# Patient Record
Sex: MALE | Race: WHITE | NOT HISPANIC OR LATINO | Employment: PART TIME | URBAN - METROPOLITAN AREA
[De-identification: names, ages, dates, MRNs, and addresses within clinical notes are randomized per-mention and may not be internally consistent; named-entity substitution may affect disease eponyms.]

---

## 2021-03-05 ENCOUNTER — IMMUNIZATION (OUTPATIENT)
Dept: VACCINE CLINIC | Facility: HOSPITAL | Age: 21
End: 2021-03-05

## 2021-03-05 PROCEDURE — 0001A: CPT | Performed by: INTERNAL MEDICINE

## 2021-03-05 PROCEDURE — 91300 HC SARSCOV02 VAC 30MCG/0.3ML IM: CPT | Performed by: INTERNAL MEDICINE

## 2021-03-26 ENCOUNTER — IMMUNIZATION (OUTPATIENT)
Dept: VACCINE CLINIC | Facility: HOSPITAL | Age: 21
End: 2021-03-26

## 2021-03-26 PROCEDURE — 91300 HC SARSCOV02 VAC 30MCG/0.3ML IM: CPT | Performed by: INTERNAL MEDICINE

## 2021-03-26 PROCEDURE — 0002A: CPT | Performed by: INTERNAL MEDICINE

## 2023-07-21 PROBLEM — N13.30 HYDRONEPHROSIS: Status: ACTIVE | Noted: 2023-07-21

## 2023-08-09 ENCOUNTER — HOSPITAL ENCOUNTER (OUTPATIENT)
Dept: ULTRASOUND IMAGING | Facility: HOSPITAL | Age: 23
Discharge: HOME | End: 2023-08-09
Admitting: UROLOGY
Payer: COMMERCIAL

## 2023-08-09 DIAGNOSIS — N13.30 HYDRONEPHROSIS, UNSPECIFIED HYDRONEPHROSIS TYPE: ICD-10-CM

## 2023-08-09 PROCEDURE — 76775 US EXAM ABDO BACK WALL LIM: CPT

## 2023-08-16 ENCOUNTER — OFFICE VISIT (OUTPATIENT)
Dept: UROLOGY | Facility: CLINIC | Age: 23
End: 2023-08-16
Payer: COMMERCIAL

## 2023-08-16 VITALS — HEART RATE: 90 BPM | BODY MASS INDEX: 20.31 KG/M2 | OXYGEN SATURATION: 98 % | WEIGHT: 134 LBS | HEIGHT: 68 IN

## 2023-08-16 DIAGNOSIS — N20.1 LEFT URETERAL CALCULUS: Primary | ICD-10-CM

## 2023-08-16 NOTE — PROGRESS NOTES
Office Note Kidney Stone      Patient Name: Elie Arroyo  : 2000   MRN: [id number]     Chief Complaint: History of Nephrolithiasis/Ureterolithiasis     Referring Provider: No ref. provider found    History of Present Illness: Elie Arroyo is a 23 y.o. male who presents today for history of 2-3 mm left UPJ calculus with mild hydronephrosis. He was last seen in office on 23 and had passed the stone at that time. He presents today for follow up renal US.    Renal US 23; No hydronephrosis.    He reports this is his second stone he has passed. He previously passed a small stone at age 10.  His mother has stones.    He denies flank pain, dysuria or hematuria. He is feeling well. He has been trying to increase water intake and has been using fresh squeezed lemon in water.   Subjective      Review of System: Review of Systems   Genitourinary:  Negative for decreased urine volume, difficulty urinating, dysuria, enuresis, flank pain, frequency, hematuria and urgency.      I have reviewed the ROS documented by my clinical staff, updated as appropriate and I agree. SUN Calderon    Past Medical History:   Past Medical History:   Diagnosis Date    Hydronephrosis 2023    Kidney stone , 2023    One when I was 10, one when I was 23    Urinary tract infection 2023    From kidney stone       Past Surgical History: History reviewed. No pertinent surgical history.    Family History:   Family History   Problem Relation Age of Onset    Heart disease Mother         Heart attack    Heart disease Father         Pre diabetic    Cancer Maternal Grandfather         Colon    Clotting disorder Maternal Grandfather         Clots in lungs, legs, brain    Heart disease Maternal Grandfather         Heart attack, diabetes    Heart disease Maternal Grandmother         Heart attack    Heart disease Paternal Grandfather         Pre diabetic, high triglycerides       Social History:   Social  "History     Socioeconomic History    Marital status: Single   Tobacco Use    Smoking status: Never     Passive exposure: Past    Smokeless tobacco: Never    Tobacco comments:     Only ever ingested second hand in any significant quantity in the past.   Substance and Sexual Activity    Alcohol use: Never    Drug use: Never    Sexual activity: Not Currently     Partners: Female, Male     Birth control/protection: Condom, Spermicide, Same-sex partner       Medications:     Current Outpatient Medications:     oxyCODONE-acetaminophen (PERCOCET) 5-325 MG per tablet, Take 1 tablet by mouth Every 6 (Six) Hours As Needed for Moderate Pain or Severe Pain for up to 12 doses. (Patient not taking: Reported on 8/16/2023), Disp: 12 tablet, Rfl: 0    Allergies:   No Known Allergies    Objective     Physical Exam:   Vital Signs:   Vitals:    08/16/23 1007   Pulse: 90   SpO2: 98%   Weight: 60.8 kg (134 lb)   Height: 172.7 cm (67.99\")   PainSc: 0-No pain     Body mass index is 20.38 kg/mý.     Physical Exam  Vitals and nursing note reviewed.   Constitutional:       Appearance: Normal appearance.   HENT:      Head: Normocephalic and atraumatic.      Nose: Nose normal.      Mouth/Throat:      Mouth: Mucous membranes are moist.   Eyes:      Pupils: Pupils are equal, round, and reactive to light.   Pulmonary:      Effort: Pulmonary effort is normal.   Abdominal:      General: Abdomen is flat.      Palpations: Abdomen is soft.   Musculoskeletal:         General: Normal range of motion.      Cervical back: Normal range of motion.   Skin:     General: Skin is warm and dry.      Capillary Refill: Capillary refill takes less than 2 seconds.   Neurological:      General: No focal deficit present.      Mental Status: He is alert.   Psychiatric:         Mood and Affect: Mood normal.     Labs:   Brief Urine Lab Results  (Last result in the past 365 days)        Color   Clarity   Blood   Leuk Est   Nitrite   Protein   CREAT   Urine HCG        " 07/13/23 1224 Orange   Turbid   Large (3+)   Small (1+)   Negative   100 mg/dL (2+)                        Lab Results   Component Value Date    GLUCOSE 140 (H) 07/13/2023    CALCIUM 9.5 07/13/2023     07/13/2023    K 3.4 (L) 07/13/2023    CO2 27.0 07/13/2023     07/13/2023    BUN 12 07/13/2023    CREATININE 1.08 07/13/2023    BCR 11.1 07/13/2023    ANIONGAP 13.0 07/13/2023       Lab Results   Component Value Date    WBC 6.49 07/13/2023    HGB 15.3 07/13/2023    HCT 45.5 07/13/2023    MCV 89.2 07/13/2023     07/13/2023         Images:   CT Abdomen Pelvis Without Contrast    Result Date: 7/13/2023  1. There is a 2 to 3 mm calculus just below the left UPJ with associated mild obstructive uropathy. 2. No acute intra-abdominal or intrapelvic abnormality otherwise appreciated Electronically Signed: Partha Munguia  7/13/2023 1:27 PM EDT  Workstation ID: [id number]    US Renal Bilateral    Result Date: 8/9/2023  Impression: Unremarkable appearance of the kidneys. Electronically Signed: Mike Ferrera MD  8/9/2023 11:36 AM CDT  Workstation ID: [id number]      Measures:   Tobacco:   Elie Arroyo  reports that he has never smoked. He has been exposed to tobacco smoke. He has never used smokeless tobacco..      Urine Incontinence: Patient reports that he is not currently experiencing any symptoms of urinary incontinence.     Assessment / Plan      Assessment/Plan:   Elie Arroyo is a 23 y.o. male who presented today with history of 2-3 mm Left UPJ calculus and mild left hydronephrosis. He has passed his stone and hydronephrosis has resolved on Renal US.     We discussed to continue increasing water intake to 2-2.5 L per day as well as using fresh squeezed lemon in water and decreasing sodium intake in diet. He is agreeable. He will follow up in 1 year.    Diagnoses and all orders for this visit:    1. Left ureteral calculus (Primary)       Follow Up:   Return in about 1 year (around 8/16/2024) for  Dr. Farley .    I spent approximately 20 minutes providing clinical care for this patient; including review of patient's chart and provider documentation, face to face time spent with patient in examination room (obtaining history, performing physical exam, discussing diagnosis and management options), placing orders, and completing patient documentation.     SUN Calderon  [facility] [place]

## 2024-11-18 ENCOUNTER — APPOINTMENT (OUTPATIENT)
Dept: PRIMARY CARE | Facility: CLINIC | Age: 24
End: 2024-11-18
Payer: COMMERCIAL

## 2024-11-18 VITALS
SYSTOLIC BLOOD PRESSURE: 126 MMHG | WEIGHT: 139 LBS | DIASTOLIC BLOOD PRESSURE: 84 MMHG | HEIGHT: 67 IN | BODY MASS INDEX: 21.82 KG/M2 | HEART RATE: 87 BPM

## 2024-11-18 DIAGNOSIS — Z00.00 ROUTINE GENERAL MEDICAL EXAMINATION AT A HEALTH CARE FACILITY: Primary | ICD-10-CM

## 2024-11-18 DIAGNOSIS — F31.12 BIPOLAR 1 DISORDER WITH MODERATE MANIA (MULTI): ICD-10-CM

## 2024-11-18 PROBLEM — L40.9 PSORIASIS: Status: ACTIVE | Noted: 2017-09-01

## 2024-11-18 PROCEDURE — 99385 PREV VISIT NEW AGE 18-39: CPT | Performed by: FAMILY MEDICINE

## 2024-11-18 PROCEDURE — 3008F BODY MASS INDEX DOCD: CPT | Performed by: FAMILY MEDICINE

## 2024-11-18 RX ORDER — BUPROPION HYDROCHLORIDE 75 MG/1
1 TABLET ORAL
COMMUNITY
Start: 2024-11-10 | End: 2024-11-18 | Stop reason: ALTCHOICE

## 2024-11-18 RX ORDER — BUPROPION HYDROCHLORIDE 150 MG/1
150 TABLET ORAL EVERY MORNING
Qty: 90 TABLET | Refills: 1 | Status: SHIPPED | OUTPATIENT
Start: 2024-11-18 | End: 2025-11-18

## 2024-11-18 RX ORDER — LAMOTRIGINE 25 MG/1
1 TABLET ORAL
COMMUNITY
Start: 2024-11-10 | End: 2024-11-18 | Stop reason: SDUPTHER

## 2024-11-18 RX ORDER — LAMOTRIGINE 25 MG/1
25 TABLET ORAL 2 TIMES DAILY
Qty: 180 TABLET | Refills: 1 | Status: SHIPPED | OUTPATIENT
Start: 2024-11-18 | End: 2025-05-17

## 2024-11-18 ASSESSMENT — PATIENT HEALTH QUESTIONNAIRE - PHQ9
SUM OF ALL RESPONSES TO PHQ9 QUESTIONS 1 AND 2: 0
1. LITTLE INTEREST OR PLEASURE IN DOING THINGS: NOT AT ALL
2. FEELING DOWN, DEPRESSED OR HOPELESS: NOT AT ALL

## 2024-11-18 ASSESSMENT — ENCOUNTER SYMPTOMS
LOSS OF SENSATION IN FEET: 0
OCCASIONAL FEELINGS OF UNSTEADINESS: 0
DEPRESSION: 0

## 2024-11-18 NOTE — PROGRESS NOTES
"Subjective   Patient ID: Vin Wagner is a 24 y.o. adult who presents for New Patient Visit and Annual Exam.    Last Physical : ___1 _ Years ago     Pt's PMH, PSH, SH, FH , meds and allergies was obtained / reviewed and updated .     Dental  Visits : Y  Vision issues : N  Hearing issues : N    Immunizations : Y    Diet :  Could be better   Exercise:  Not regularly  Weight concerns : None    Alcohol: as noted in SH  Tobacco: as noted in SH  Recreational drugs :  None /as noted in SH     Sexually active : Active   Contraception :   Erectile dysfunction :    Colorectal cancer screening   Prostate cancer screening     Metabolic screening   - Lipids   - Glucose   Patient states that he was diagnosed with bipolar disorder he is on Lamictal and Wellbutrin was doing well had discussed increasing his dose of Wellbutrin prior to switching insurances denies any side effects previous care was in [place]  Currently working at [employer] mood has been stable  Has had kidney stones last one was in [place]  ==================================    Visit Vitals  /84   Pulse 87   Ht 1.702 m (5' 7\")   Wt 63 kg (139 lb)   BMI 21.77 kg/m²   Smoking Status Never   BSA 1.73 m²      =====================  Review of Systems:    Constitutional: no chills, no fever and no night sweats.     Eyes: no blurred vision and no eyesight problems.     ENT: no hearing loss, no nasal congestion, no nasal discharge, no hoarseness and no sore throat.     Cardiovascular: no chest pain, no intermittent leg claudication, no lower extremity edema, no palpitations and no syncope.     Respiratory: no cough, no shortness of breath during exertion, no shortness of breath at rest and no wheezing.     Gastrointestinal: no abdominal pain, no constipation, no blood in stools, no diarrhea, no melena, no nausea, no rectal pain and no vomiting.     Genitourinary: no dysuria, no change in urinary frequency, no urinary hesitancy and no feelings of " urinary urgency.     Musculoskeletal: no arthralgias, no back pain and no myalgias.     Integumentary: no new skin lesions and no rashes.     Neurological: no difficulty walking, no headache, no limb weakness, no numbness and no tingling.     Psychiatric: As per HPI    Endocrine: no recent weight gain and no recent weight loss.     Hematologic/Lymphatic: no tendency for easy bruising and no swollen glands.          All other systems have been reviewed and are negative for complaint.    =====================================================    Physical exam :    Constitutional: Alert and in no acute distress. Well developed, well nourished.     Eyes: Normal external exam. Pupils were equal in size, round, reactive to light (PERRL) with normal accommodation and extraocular movements intact (EOMI).     Ears, Nose, Mouth, and Throat: External inspection of ears and nose: Normal.  Otoscopic examination: Normal.      Neck: No neck mass was observed. Supple.     Cardiovascular: Heart rate and rhythm were normal, normal S1 and S2, no gallops, no murmurs and no pericardial rub    Pulmonary: No respiratory distress. Clear bilateral breath sounds.     Abdomen: Soft nontender; no abdominal mass palpated. No organomegaly.     Musculoskeletal: No joint swelling seen, normal movements of all extremities. Range of motion: Normal.  Muscle strength/tone: Normal.      Skin: Normal skin color and pigmentation, normal skin turgor, and no rash.     Neurologic: Deep tendon reflexes were 2+ and symmetric. Sensation: Normal.     Psychiatric: Judgment and insight: Intact. Mood and affect: Normal.    Lymphatic : Cervical/ axillary/ groin Lns Palpable/ non palpable       Assessment/Plan    Problem List Items Addressed This Visit       Bipolar 1 disorder with moderate shweta (Multi)    Relevant Medications    lamoTRIgine (LaMICtal) 25 mg tablet    buPROPion XL (Wellbutrin XL) 150 mg 24 hr tablet    Other Relevant Orders    Referral to [facility]    Routine general medical examination at a health care facility - Primary    Relevant Orders    Lipid Panel    CBC    TSH with reflex to Free T4 if abnormal    Hemoglobin A1C    Comprehensive Metabolic Panel

## 2024-12-01 PROBLEM — Z00.00 ROUTINE GENERAL MEDICAL EXAMINATION AT A HEALTH CARE FACILITY: Status: ACTIVE | Noted: 2024-12-01

## 2024-12-05 ENCOUNTER — LAB (OUTPATIENT)
Dept: LAB | Facility: LAB | Age: 24
End: 2024-12-05
Payer: COMMERCIAL

## 2024-12-05 DIAGNOSIS — Z00.00 ROUTINE GENERAL MEDICAL EXAMINATION AT A HEALTH CARE FACILITY: ICD-10-CM

## 2024-12-05 LAB
ALBUMIN SERPL BCP-MCNC: 4.4 G/DL (ref 3.4–5)
ALP SERPL-CCNC: 60 U/L (ref 33–120)
ALT SERPL W P-5'-P-CCNC: 14 U/L (ref 7–52)
ANION GAP SERPL CALC-SCNC: 11 MMOL/L (ref 10–20)
AST SERPL W P-5'-P-CCNC: 16 U/L (ref 9–39)
BILIRUB SERPL-MCNC: 1.4 MG/DL (ref 0–1.2)
BUN SERPL-MCNC: 10 MG/DL (ref 6–23)
CALCIUM SERPL-MCNC: 9.4 MG/DL (ref 8.6–10.3)
CHLORIDE SERPL-SCNC: 105 MMOL/L (ref 98–107)
CHOLEST SERPL-MCNC: 171 MG/DL (ref 0–199)
CHOLESTEROL/HDL RATIO: 3.7
CO2 SERPL-SCNC: 30 MMOL/L (ref 21–32)
CREAT SERPL-MCNC: 1.02 MG/DL (ref 0.5–1.3)
EGFRCR SERPLBLD CKD-EPI 2021: >90 ML/MIN/1.73M*2
ERYTHROCYTE [DISTWIDTH] IN BLOOD BY AUTOMATED COUNT: 11.8 % (ref 11.5–14.5)
GLUCOSE SERPL-MCNC: 78 MG/DL (ref 74–99)
HCT VFR BLD AUTO: 49.9 % (ref 36–52)
HDLC SERPL-MCNC: 45.9 MG/DL
HGB BLD-MCNC: 16.4 G/DL (ref 12–17.5)
LDLC SERPL CALC-MCNC: 116 MG/DL
MCH RBC QN AUTO: 30.1 PG (ref 26–34)
MCHC RBC AUTO-ENTMCNC: 32.9 G/DL (ref 32–36)
MCV RBC AUTO: 92 FL (ref 80–100)
NON HDL CHOLESTEROL: 125 MG/DL (ref 0–149)
NRBC BLD-RTO: 0 /100 WBCS (ref 0–0)
PLATELET # BLD AUTO: 331 X10*3/UL (ref 150–450)
POTASSIUM SERPL-SCNC: 4.1 MMOL/L (ref 3.5–5.3)
PROT SERPL-MCNC: 6.7 G/DL (ref 6.4–8.2)
RBC # BLD AUTO: 5.44 X10*6/UL (ref 4–5.9)
SODIUM SERPL-SCNC: 142 MMOL/L (ref 136–145)
TRIGL SERPL-MCNC: 47 MG/DL (ref 0–149)
TSH SERPL-ACNC: 2.85 MIU/L (ref 0.44–3.98)
VLDL: 9 MG/DL (ref 0–40)
WBC # BLD AUTO: 7.5 X10*3/UL (ref 4.4–11.3)

## 2024-12-05 PROCEDURE — 84443 ASSAY THYROID STIM HORMONE: CPT

## 2024-12-05 PROCEDURE — 83036 HEMOGLOBIN GLYCOSYLATED A1C: CPT

## 2024-12-05 PROCEDURE — 80061 LIPID PANEL: CPT

## 2024-12-05 PROCEDURE — 36415 COLL VENOUS BLD VENIPUNCTURE: CPT

## 2024-12-05 PROCEDURE — 80053 COMPREHEN METABOLIC PANEL: CPT

## 2024-12-05 PROCEDURE — 85027 COMPLETE CBC AUTOMATED: CPT

## 2024-12-06 LAB
EST. AVERAGE GLUCOSE BLD GHB EST-MCNC: 91 MG/DL
HBA1C MFR BLD: 4.8 %

## 2025-01-10 ENCOUNTER — HOSPITAL ENCOUNTER (OUTPATIENT)
Dept: RADIOLOGY | Facility: CLINIC | Age: 25
Discharge: HOME | End: 2025-01-10
Payer: COMMERCIAL

## 2025-01-10 ENCOUNTER — APPOINTMENT (OUTPATIENT)
Dept: PRIMARY CARE | Facility: CLINIC | Age: 25
End: 2025-01-10
Payer: COMMERCIAL

## 2025-01-10 VITALS
BODY MASS INDEX: 21.03 KG/M2 | DIASTOLIC BLOOD PRESSURE: 79 MMHG | SYSTOLIC BLOOD PRESSURE: 127 MMHG | WEIGHT: 134 LBS | HEIGHT: 67 IN | HEART RATE: 79 BPM

## 2025-01-10 DIAGNOSIS — F31.12 BIPOLAR 1 DISORDER WITH MODERATE MANIA (MULTI): ICD-10-CM

## 2025-01-10 DIAGNOSIS — L40.9 PSORIASIS: ICD-10-CM

## 2025-01-10 DIAGNOSIS — R20.2 PARESTHESIA: ICD-10-CM

## 2025-01-10 DIAGNOSIS — L40.9 PSORIASIS OF SCALP: Primary | ICD-10-CM

## 2025-01-10 DIAGNOSIS — L40.9 PSORIASIS OF SCALP: ICD-10-CM

## 2025-01-10 PROCEDURE — 72072 X-RAY EXAM THORAC SPINE 3VWS: CPT

## 2025-01-10 PROCEDURE — 99214 OFFICE O/P EST MOD 30 MIN: CPT | Performed by: FAMILY MEDICINE

## 2025-01-10 PROCEDURE — 3008F BODY MASS INDEX DOCD: CPT | Performed by: FAMILY MEDICINE

## 2025-01-10 PROCEDURE — 72110 X-RAY EXAM L-2 SPINE 4/>VWS: CPT

## 2025-01-10 RX ORDER — ROFLUMILAST 3 MG/G
1 AEROSOL, FOAM TOPICAL DAILY
Qty: 60 G | Refills: 3 | Status: SHIPPED
Start: 2025-01-10 | End: 2025-01-10 | Stop reason: SDUPTHER

## 2025-01-10 RX ORDER — ROFLUMILAST 3 MG/G
1 AEROSOL, FOAM TOPICAL DAILY
Qty: 60 G | Refills: 3 | Status: SHIPPED | OUTPATIENT
Start: 2025-01-10 | End: 2025-02-09

## 2025-01-10 ASSESSMENT — ENCOUNTER SYMPTOMS
OCCASIONAL FEELINGS OF UNSTEADINESS: 0
NUMBNESS: 1
WEAKNESS: 0
DYSURIA: 0
DEPRESSION: 0
PARESTHESIAS: 0
PERIANAL NUMBNESS: 0
ABDOMINAL PAIN: 0
LEG PAIN: 0
PARESIS: 0
HEADACHES: 0
FEVER: 0
TINGLING: 1
LOSS OF SENSATION IN FEET: 0
BACK PAIN: 1
WEIGHT LOSS: 0
BOWEL INCONTINENCE: 0

## 2025-01-10 ASSESSMENT — PATIENT HEALTH QUESTIONNAIRE - PHQ9
SUM OF ALL RESPONSES TO PHQ9 QUESTIONS 1 AND 2: 0
SUM OF ALL RESPONSES TO PHQ9 QUESTIONS 1 AND 2: 0
1. LITTLE INTEREST OR PLEASURE IN DOING THINGS: NOT AT ALL
2. FEELING DOWN, DEPRESSED OR HOPELESS: NOT AT ALL
2. FEELING DOWN, DEPRESSED OR HOPELESS: NOT AT ALL
1. LITTLE INTEREST OR PLEASURE IN DOING THINGS: NOT AT ALL

## 2025-01-10 NOTE — PROGRESS NOTES
"Subjective   Patient ID: Vin Wagner is a 24 y.o. adult who presents for Follow-up.      Back Pain  This is a new problem. The current episode started more than 1 month ago. The problem occurs daily. The problem has been gradually worsening since onset. The pain is present in the lumbar spine. The pain does not radiate. The pain is at a severity of 1/10. The symptoms are aggravated by sitting. Associated symptoms include numbness and tingling. Pertinent negatives include no abdominal pain, bladder incontinence, bowel incontinence, chest pain, dysuria, fever, headaches, leg pain, paresis, paresthesias, pelvic pain, perianal numbness, weakness or weight loss. Risk factors include lack of exercise and poor posture.   Patient has been on Wellbutrin 150mg and lamictal. States he is kdepressed at times  Seeing  psych next week.   Has plaques on scalp that have been thicker and itchier.   Current Outpatient Medications on File Prior to Visit   Medication Sig Dispense Refill    buPROPion XL (Wellbutrin XL) 150 mg 24 hr tablet Take 1 tablet (150 mg) by mouth once daily in the morning. Do not crush, chew, or split. 90 tablet 1    lamoTRIgine (LaMICtal) 25 mg tablet Take 1 tablet (25 mg) by mouth 2 times a day. 180 tablet 1     No current facility-administered medications on file prior to visit.        Review of Systems   Constitutional:  Negative for fever and weight loss.   Cardiovascular:  Negative for chest pain.   Gastrointestinal:  Negative for abdominal pain and bowel incontinence.   Genitourinary:  Negative for bladder incontinence, dysuria and pelvic pain.   Musculoskeletal:  Positive for back pain.   Skin:  Positive for rash.   Neurological:  Positive for tingling and numbness. Negative for weakness, headaches and paresthesias.   Psychiatric/Behavioral:  Positive for dysphoric mood.    Psoriasis has been worse    Objective   /79   Pulse 79   Ht 1.702 m (5' 7\")   Wt 60.8 kg (134 lb)   BMI 20.99 kg/m²   BSA: " 1.7 meters squared  Growth percentiles: Facility age limit for growth %lea is 20 years. Facility age limit for growth %lea is 20 years.   No visits with results within 1 Week(s) from this visit.   Latest known visit with results is:   Lab on 12/05/2024   Component Date Value Ref Range Status    Cholesterol 12/05/2024 171  0 - 199 mg/dL Final          Age      Desirable   Borderline High   High     0-19 Y     0 - 169       170 - 199     >/= 200    20-24 Y     0 - 189       190 - 224     >/= 225         >24 Y     0 - 199       200 - 239     >/= 240   **All ranges are based on fasting samples. Specific   therapeutic targets will vary based on patient-specific   cardiac risk.    Pediatric guidelines reference:Pediatrics 2011, 128(S5).Adult guidelines reference: NCEP ATPIII Guidelines,SAMREEN 2001, 258:2486-97    Venipuncture immediately after or during the administration of Metamizole may lead to falsely low results. Testing should be performed immediately prior to Metamizole dosing.    HDL-Cholesterol 12/05/2024 45.9  mg/dL Final      Age       Very Low   Low     Normal    High    0-19 Y    < 35      < 40     40-45     ----  20-24 Y    ----     < 40      >45      ----        >24 Y      ----     < 40     40-60      >60      Cholesterol/HDL Ratio 12/05/2024 3.7   Final      Ref Values  Desirable  < 3.4  High Risk  > 5.0    LDL Calculated 12/05/2024 116 (H)  <=99 mg/dL Final                                Near   Borderline      AGE      Desirable  Optimal    High     High     Very High     0-19 Y     0 - 109     ---    110-129   >/= 130     ----    20-24 Y     0 - 119     ---    120-159   >/= 160     ----      >24 Y     0 -  99   100-129  130-159   160-189     >/=190      VLDL 12/05/2024 9  0 - 40 mg/dL Final    Triglycerides 12/05/2024 47  0 - 149 mg/dL Final    Age              Desirable        Borderline         High        Very High  SEX:B           mg/dL             mg/dL               mg/dL      mg/dL  <=14D                        ----               ----        ----  15D-365D                    ----               ----        ----  1Y-9Y           0-74               75-99             >=100       ----  10Y-19Y        0-89                            >=130       ----  20Y-24Y        0-114             115-149             >=150      ----  >= 25Y         0-149             150-199             200-499    >=500      Venipuncture immediately after or during the administration of Metamizole may lead to falsely low results. Testing should be performed immediately prior to Metamizole dosing.    Non HDL Cholesterol 12/05/2024 125  0 - 149 mg/dL Final          Age       Desirable   Borderline High   High     Very High     0-19 Y     0 - 119       120 - 144     >/= 145    >/= 160    20-24 Y     0 - 149       150 - 189     >/= 190      ----         >24 Y    30 mg/dL above LDL Cholesterol goal      WBC 12/05/2024 7.5  4.4 - 11.3 x10*3/uL Final    nRBC 12/05/2024 0.0  0.0 - 0.0 /100 WBCs Final    RBC 12/05/2024 5.44  4.00 - 5.90 x10*6/uL Final    Hemoglobin 12/05/2024 16.4  12.0 - 17.5 g/dL Final    Hematocrit 12/05/2024 49.9  36.0 - 52.0 % Final    MCV 12/05/2024 92  80 - 100 fL Final    MCH 12/05/2024 30.1  26.0 - 34.0 pg Final    MCHC 12/05/2024 32.9  32.0 - 36.0 g/dL Final    RDW 12/05/2024 11.8  11.5 - 14.5 % Final    Platelets 12/05/2024 331  150 - 450 x10*3/uL Final    Thyroid Stimulating Hormone 12/05/2024 2.85  0.44 - 3.98 mIU/L Final    Hemoglobin A1C 12/05/2024 4.8  See comment % Final    Estimated Average Glucose 12/05/2024 91  Not Established mg/dL Final    Glucose 12/05/2024 78  74 - 99 mg/dL Final    Sodium 12/05/2024 142  136 - 145 mmol/L Final    Potassium 12/05/2024 4.1  3.5 - 5.3 mmol/L Final    Chloride 12/05/2024 105  98 - 107 mmol/L Final    Bicarbonate 12/05/2024 30  21 - 32 mmol/L Final    Anion Gap 12/05/2024 11  10 - 20 mmol/L Final    Urea Nitrogen 12/05/2024 10  6 - 23 mg/dL Final    Creatinine  12/05/2024 1.02  0.50 - 1.30 mg/dL Final    eGFR 12/05/2024 >90  >60 mL/min/1.73m*2 Final    Calculations of estimated GFR are performed using the 2021 CKD-EPI Study Refit equation without the race variable for the IDMS-Traceable creatinine methods.  https://jasn.asnjournals.org/content/early/2021/09/22/ASN.1118713486    Calcium 12/05/2024 9.4  8.6 - 10.3 mg/dL Final    Albumin 12/05/2024 4.4  3.4 - 5.0 g/dL Final    Alkaline Phosphatase 12/05/2024 60  33 - 120 U/L Final    Total Protein 12/05/2024 6.7  6.4 - 8.2 g/dL Final    AST 12/05/2024 16  9 - 39 U/L Final    Bilirubin, Total 12/05/2024 1.4 (H)  0.0 - 1.2 mg/dL Final    ALT 12/05/2024 14  7 - 52 U/L Final    Patients treated with Sulfasalazine may generate falsely decreased results for ALT.      Physical Exam  Constitutional:       General: Vin is not in acute distress.  Eyes:      Extraocular Movements: Extraocular movements intact.   Cardiovascular:      Rate and Rhythm: Normal rate and regular rhythm.   Pulmonary:      Breath sounds: Normal breath sounds.   Abdominal:      General: Bowel sounds are normal.   Musculoskeletal:         General: Normal range of motion.      Cervical back: No rigidity.      Comments: Spasm of the paraspinal muscles left thoracolumbar spine   Skin:     Findings: Rash present.      Comments: Psoriasis of the scalp   Neurological:      General: No focal deficit present.      Mental Status: Vin is alert.   Psychiatric:         Thought Content: Thought content normal.         Assessment/Plan   Problem List Items Addressed This Visit       Bipolar 1 disorder with moderate shweta (Multi)     Will see psychiatry as scheduled offered to increase Wellbutrin wants to wait till next week         Psoriasis    Psoriasis of scalp - Primary     Will refer to dermatology         Relevant Orders    Referral to Dermatology    Paresthesia     Sits at the desk advised to get up and move around more and start home exercise program for the lumbar  spine         Relevant Orders    XR thoracic spine 3 views    XR lumbar spine complete 4+ views

## 2025-01-11 PROBLEM — L40.9 PSORIASIS OF SCALP: Status: ACTIVE | Noted: 2025-01-11

## 2025-01-11 PROBLEM — R20.2 PARESTHESIA: Status: ACTIVE | Noted: 2025-01-11

## 2025-01-11 ASSESSMENT — ENCOUNTER SYMPTOMS
PARESIS: 0
ABDOMINAL PAIN: 0
TINGLING: 1
DYSURIA: 0
DYSPHORIC MOOD: 1
LEG PAIN: 0
PERIANAL NUMBNESS: 0
WEIGHT LOSS: 0
NUMBNESS: 1
PARESTHESIAS: 0
BACK PAIN: 1
HEADACHES: 0
FEVER: 0
BOWEL INCONTINENCE: 0
WEAKNESS: 0

## 2025-01-11 NOTE — ASSESSMENT & PLAN NOTE
Sits at the desk advised to get up and move around more and start home exercise program for the lumbar spine

## 2025-01-15 ENCOUNTER — APPOINTMENT (OUTPATIENT)
Dept: BEHAVIORAL HEALTH | Facility: CLINIC | Age: 25
End: 2025-01-15
Payer: COMMERCIAL

## 2025-01-15 VITALS
DIASTOLIC BLOOD PRESSURE: 92 MMHG | HEART RATE: 81 BPM | SYSTOLIC BLOOD PRESSURE: 126 MMHG | WEIGHT: 135.4 LBS | BODY MASS INDEX: 21.25 KG/M2 | HEIGHT: 67 IN | RESPIRATION RATE: 16 BRPM | TEMPERATURE: 98.2 F

## 2025-01-15 DIAGNOSIS — F31.12 BIPOLAR 1 DISORDER WITH MODERATE MANIA (MULTI): ICD-10-CM

## 2025-01-15 PROCEDURE — 3008F BODY MASS INDEX DOCD: CPT

## 2025-01-15 PROCEDURE — 90792 PSYCH DIAG EVAL W/MED SRVCS: CPT

## 2025-01-15 PROCEDURE — 1036F TOBACCO NON-USER: CPT

## 2025-01-15 ASSESSMENT — ANXIETY QUESTIONNAIRES
5. BEING SO RESTLESS THAT IT IS HARD TO SIT STILL: SEVERAL DAYS
1. FEELING NERVOUS, ANXIOUS, OR ON EDGE: NOT AT ALL
GAD7 TOTAL SCORE: 5
IF YOU CHECKED OFF ANY PROBLEMS ON THIS QUESTIONNAIRE, HOW DIFFICULT HAVE THESE PROBLEMS MADE IT FOR YOU TO DO YOUR WORK, TAKE CARE OF THINGS AT HOME, OR GET ALONG WITH OTHER PEOPLE: SOMEWHAT DIFFICULT
7. FEELING AFRAID AS IF SOMETHING AWFUL MIGHT HAPPEN: NOT AT ALL
2. NOT BEING ABLE TO STOP OR CONTROL WORRYING: SEVERAL DAYS
3. WORRYING TOO MUCH ABOUT DIFFERENT THINGS: SEVERAL DAYS
6. BECOMING EASILY ANNOYED OR IRRITABLE: SEVERAL DAYS
4. TROUBLE RELAXING: SEVERAL DAYS

## 2025-01-15 ASSESSMENT — COLUMBIA-SUICIDE SEVERITY RATING SCALE - C-SSRS
1. HAVE YOU WISHED YOU WERE DEAD OR WISHED YOU COULD GO TO SLEEP AND NOT WAKE UP?: NO
TOTAL  NUMBER OF ABORTED OR SELF INTERRUPTED ATTEMPTS LIFETIME: NO
6. HAVE YOU EVER DONE ANYTHING, STARTED TO DO ANYTHING, OR PREPARED TO DO ANYTHING TO END YOUR LIFE?: NO
TOTAL  NUMBER OF INTERRUPTED ATTEMPTS LIFETIME: NO
2. HAVE YOU ACTUALLY HAD ANY THOUGHTS OF KILLING YOURSELF?: NO
ATTEMPT LIFETIME: NO

## 2025-01-15 ASSESSMENT — PATIENT HEALTH QUESTIONNAIRE - PHQ9
1. LITTLE INTEREST OR PLEASURE IN DOING THINGS: SEVERAL DAYS
10. IF YOU CHECKED OFF ANY PROBLEMS, HOW DIFFICULT HAVE THESE PROBLEMS MADE IT FOR YOU TO DO YOUR WORK, TAKE CARE OF THINGS AT HOME, OR GET ALONG WITH OTHER PEOPLE: SOMEWHAT DIFFICULT
2. FEELING DOWN, DEPRESSED OR HOPELESS: SEVERAL DAYS
SUM OF ALL RESPONSES TO PHQ9 QUESTIONS 1 & 2: 2

## 2025-01-15 ASSESSMENT — ENCOUNTER SYMPTOMS
CONSTITUTIONAL NEGATIVE: 1
PSYCHIATRIC NEGATIVE: 1
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
## 2025-02-25 ENCOUNTER — APPOINTMENT (OUTPATIENT)
Dept: PRIMARY CARE | Facility: CLINIC | Age: 25
End: 2025-02-25
Payer: COMMERCIAL

## 2025-02-25 VITALS
DIASTOLIC BLOOD PRESSURE: 83 MMHG | HEART RATE: 60 BPM | BODY MASS INDEX: 21.03 KG/M2 | WEIGHT: 134 LBS | SYSTOLIC BLOOD PRESSURE: 121 MMHG | HEIGHT: 67 IN

## 2025-02-25 DIAGNOSIS — L40.9 PSORIASIS OF SCALP: Primary | ICD-10-CM

## 2025-02-25 DIAGNOSIS — F31.12 BIPOLAR 1 DISORDER WITH MODERATE MANIA (MULTI): ICD-10-CM

## 2025-02-25 DIAGNOSIS — R20.2 PARESTHESIA: ICD-10-CM

## 2025-02-25 PROCEDURE — 99214 OFFICE O/P EST MOD 30 MIN: CPT | Performed by: FAMILY MEDICINE

## 2025-02-25 PROCEDURE — 3008F BODY MASS INDEX DOCD: CPT | Performed by: FAMILY MEDICINE

## 2025-02-25 RX ORDER — ROFLUMILAST 3 MG/G
1 AEROSOL, FOAM TOPICAL DAILY
Qty: 60 G | Refills: 3 | Status: SHIPPED | OUTPATIENT
Start: 2025-02-25 | End: 2025-03-27

## 2025-02-25 RX ORDER — ROFLUMILAST 3 MG/G
1 AEROSOL, FOAM TOPICAL DAILY
Qty: 60 G | Refills: 3 | Status: SHIPPED | OUTPATIENT
Start: 2025-02-25 | End: 2025-02-25 | Stop reason: SDUPTHER

## 2025-02-25 ASSESSMENT — PATIENT HEALTH QUESTIONNAIRE - PHQ9
1. LITTLE INTEREST OR PLEASURE IN DOING THINGS: NOT AT ALL
2. FEELING DOWN, DEPRESSED OR HOPELESS: NOT AT ALL
SUM OF ALL RESPONSES TO PHQ9 QUESTIONS 1 AND 2: 0

## 2025-02-25 ASSESSMENT — ENCOUNTER SYMPTOMS
OCCASIONAL FEELINGS OF UNSTEADINESS: 0
LOSS OF SENSATION IN FEET: 0
DEPRESSION: 0

## 2025-03-02 ASSESSMENT — ENCOUNTER SYMPTOMS
BACK PAIN: 1
DYSURIA: 0
HEADACHES: 0
FEVER: 0
WEAKNESS: 0
PARESTHESIAS: 0
TINGLING: 1
BOWEL INCONTINENCE: 0
ABDOMINAL PAIN: 0
NUMBNESS: 1
PARESIS: 0
PERIANAL NUMBNESS: 0
DYSPHORIC MOOD: 1
WEIGHT LOSS: 0
LEG PAIN: 0

## 2025-03-02 NOTE — PROGRESS NOTES
"Subjective   Patient ID: Vin Wagner is a 24 y.o. adult who presents for Follow-up.      Back Pain  This is a new problem. The current episode started more than 1 month ago. The problem occurs daily. The problem has been gradually worsening since onset. The pain is present in the lumbar spine. The pain does not radiate. The pain is at a severity of 1/10. The symptoms are aggravated by sitting. Associated symptoms include numbness and tingling. Pertinent negatives include no abdominal pain, bladder incontinence, bowel incontinence, chest pain, dysuria, fever, headaches, leg pain, paresis, paresthesias, pelvic pain, perianal numbness, weakness or weight loss. Risk factors include lack of exercise and poor posture.   Saw psychiatry Lamictal increased slightly more tired.     Has plaques on scalp psoriasis Foam works very well/   Current Outpatient Medications on File Prior to Visit   Medication Sig Dispense Refill    buPROPion XL (Wellbutrin XL) 150 mg 24 hr tablet Take 1 tablet (150 mg) by mouth once daily in the morning. Do not crush, chew, or split. 90 tablet 1    lamoTRIgine (LaMICtal) 25 mg tablet Take 1 tablet (25 mg) by mouth 2 times a day. 180 tablet 1    [DISCONTINUED] roflumilast (Zoryve) 0.3 % foam Apply 1 Application topically once daily. 60 g 3     No current facility-administered medications on file prior to visit.        Review of Systems   Constitutional:  Negative for fever and weight loss.   Cardiovascular:  Negative for chest pain.   Gastrointestinal:  Negative for abdominal pain and bowel incontinence.   Genitourinary:  Negative for bladder incontinence, dysuria and pelvic pain.   Musculoskeletal:  Positive for back pain.   Skin:  Positive for rash.   Neurological:  Positive for tingling and numbness. Negative for weakness, headaches and paresthesias.   Psychiatric/Behavioral:  Positive for dysphoric mood.    Psoriasis has been worse    Objective   /83   Pulse 60   Ht 1.702 m (5' 7\")   Wt " 60.8 kg (134 lb)   BMI 20.99 kg/m²   BSA: 1.7 meters squared  Growth percentiles: Facility age limit for growth %lea is 20 years. Facility age limit for growth %lea is 20 years.   No visits with results within 1 Week(s) from this visit.   Latest known visit with results is:   Lab on 12/05/2024   Component Date Value Ref Range Status    Cholesterol 12/05/2024 171  0 - 199 mg/dL Final          Age      Desirable   Borderline High   High     0-19 Y     0 - 169       170 - 199     >/= 200    20-24 Y     0 - 189       190 - 224     >/= 225         >24 Y     0 - 199       200 - 239     >/= 240   **All ranges are based on fasting samples. Specific   therapeutic targets will vary based on patient-specific   cardiac risk.    Pediatric guidelines reference:Pediatrics 2011, 128(S5).Adult guidelines reference: NCEP ATPIII Guidelines,SAMREEN 2001, 258:2486-97    Venipuncture immediately after or during the administration of Metamizole may lead to falsely low results. Testing should be performed immediately prior to Metamizole dosing.    HDL-Cholesterol 12/05/2024 45.9  mg/dL Final      Age       Very Low   Low     Normal    High    0-19 Y    < 35      < 40     40-45     ----  20-24 Y    ----     < 40      >45      ----        >24 Y      ----     < 40     40-60      >60      Cholesterol/HDL Ratio 12/05/2024 3.7   Final      Ref Values  Desirable  < 3.4  High Risk  > 5.0    LDL Calculated 12/05/2024 116 (H)  <=99 mg/dL Final                                Near   Borderline      AGE      Desirable  Optimal    High     High     Very High     0-19 Y     0 - 109     ---    110-129   >/= 130     ----    20-24 Y     0 - 119     ---    120-159   >/= 160     ----      >24 Y     0 -  99   100-129  130-159   160-189     >/=190      VLDL 12/05/2024 9  0 - 40 mg/dL Final    Triglycerides 12/05/2024 47  0 - 149 mg/dL Final    Age              Desirable        Borderline         High        Very High  SEX:B           mg/dL             mg/dL                mg/dL      mg/dL  <=14D                       ----               ----        ----  15D-365D                    ----               ----        ----  1Y-9Y           0-74               75-99             >=100       ----  10Y-19Y        0-89                            >=130       ----  20Y-24Y        0-114             115-149             >=150      ----  >= 25Y         0-149             150-199             200-499    >=500      Venipuncture immediately after or during the administration of Metamizole may lead to falsely low results. Testing should be performed immediately prior to Metamizole dosing.    Non HDL Cholesterol 12/05/2024 125  0 - 149 mg/dL Final          Age       Desirable   Borderline High   High     Very High     0-19 Y     0 - 119       120 - 144     >/= 145    >/= 160    20-24 Y     0 - 149       150 - 189     >/= 190      ----         >24 Y    30 mg/dL above LDL Cholesterol goal      WBC 12/05/2024 7.5  4.4 - 11.3 x10*3/uL Final    nRBC 12/05/2024 0.0  0.0 - 0.0 /100 WBCs Final    RBC 12/05/2024 5.44  4.00 - 5.90 x10*6/uL Final    Hemoglobin 12/05/2024 16.4  12.0 - 17.5 g/dL Final    Hematocrit 12/05/2024 49.9  36.0 - 52.0 % Final    MCV 12/05/2024 92  80 - 100 fL Final    MCH 12/05/2024 30.1  26.0 - 34.0 pg Final    MCHC 12/05/2024 32.9  32.0 - 36.0 g/dL Final    RDW 12/05/2024 11.8  11.5 - 14.5 % Final    Platelets 12/05/2024 331  150 - 450 x10*3/uL Final    Thyroid Stimulating Hormone 12/05/2024 2.85  0.44 - 3.98 mIU/L Final    Hemoglobin A1C 12/05/2024 4.8  See comment % Final    Estimated Average Glucose 12/05/2024 91  Not Established mg/dL Final    Glucose 12/05/2024 78  74 - 99 mg/dL Final    Sodium 12/05/2024 142  136 - 145 mmol/L Final    Potassium 12/05/2024 4.1  3.5 - 5.3 mmol/L Final    Chloride 12/05/2024 105  98 - 107 mmol/L Final    Bicarbonate 12/05/2024 30  21 - 32 mmol/L Final    Anion Gap 12/05/2024 11  10 - 20 mmol/L Final    Urea Nitrogen 12/05/2024 10   6 - 23 mg/dL Final    Creatinine 12/05/2024 1.02  0.50 - 1.30 mg/dL Final    eGFR 12/05/2024 >90  >60 mL/min/1.73m*2 Final    Calculations of estimated GFR are performed using the 2021 CKD-EPI Study Refit equation without the race variable for the IDMS-Traceable creatinine methods.  https://jasn.asnjournals.org/content/early/2021/09/22/ASN.1963933108    Calcium 12/05/2024 9.4  8.6 - 10.3 mg/dL Final    Albumin 12/05/2024 4.4  3.4 - 5.0 g/dL Final    Alkaline Phosphatase 12/05/2024 60  33 - 120 U/L Final    Total Protein 12/05/2024 6.7  6.4 - 8.2 g/dL Final    AST 12/05/2024 16  9 - 39 U/L Final    Bilirubin, Total 12/05/2024 1.4 (H)  0.0 - 1.2 mg/dL Final    ALT 12/05/2024 14  7 - 52 U/L Final    Patients treated with Sulfasalazine may generate falsely decreased results for ALT.      Physical Exam  Constitutional:       General: Vin is not in acute distress.  Eyes:      Extraocular Movements: Extraocular movements intact.   Cardiovascular:      Rate and Rhythm: Normal rate and regular rhythm.   Pulmonary:      Breath sounds: Normal breath sounds.   Abdominal:      General: Bowel sounds are normal.   Musculoskeletal:         General: Normal range of motion.      Cervical back: No rigidity.      Comments: Spasm of the paraspinal muscles left thoracolumbar spine   Skin:     Findings: Rash present.      Comments: Psoriasis of the scalp   Neurological:      General: No focal deficit present.      Mental Status: Vin is alert.   Psychiatric:         Thought Content: Thought content normal.         Assessment/Plan   Problem List Items Addressed This Visit       Bipolar 1 disorder with moderate shweta (Multi)     Saw psychitry lamictal increased slightly more tired.          Psoriasis of scalp - Primary    Relevant Medications    roflumilast (Zoryve) 0.3 % foam    Paresthesia     C/w core strengthening exercies

## 2025-03-04 ENCOUNTER — APPOINTMENT (OUTPATIENT)
Dept: BEHAVIORAL HEALTH | Facility: CLINIC | Age: 25
End: 2025-03-04
Payer: COMMERCIAL

## 2025-03-04 VITALS
WEIGHT: 134 LBS | DIASTOLIC BLOOD PRESSURE: 79 MMHG | HEART RATE: 91 BPM | HEIGHT: 67 IN | TEMPERATURE: 98 F | SYSTOLIC BLOOD PRESSURE: 113 MMHG | BODY MASS INDEX: 21.03 KG/M2

## 2025-03-04 DIAGNOSIS — F31.12 BIPOLAR 1 DISORDER WITH MODERATE MANIA (MULTI): ICD-10-CM

## 2025-03-04 PROCEDURE — 3008F BODY MASS INDEX DOCD: CPT

## 2025-03-04 PROCEDURE — 99214 OFFICE O/P EST MOD 30 MIN: CPT

## 2025-03-04 PROCEDURE — 1036F TOBACCO NON-USER: CPT

## 2025-03-04 RX ORDER — LAMOTRIGINE 25 MG/1
100 TABLET ORAL DAILY
Qty: 360 TABLET | Refills: 0 | Status: SHIPPED | OUTPATIENT
Start: 2025-03-04 | End: 2025-06-02

## 2025-03-04 ASSESSMENT — ENCOUNTER SYMPTOMS
PSYCHIATRIC NEGATIVE: 1
CONSTITUTIONAL NEGATIVE: 1

## 2025-03-04 NOTE — PROGRESS NOTES
"Impression : Patient was prescribed a dose increase to Lamictal 50 mg BID to help with Bipolar Symptoms. Patient explains that he was feeling too tired and sleepy taking 50 mg BID. Explains that he decided to go back to 25 mg BID. MEME advised patient to take 25 mg in the AM and 50 mg at night to help with the fatigue.    I performed this visit in person between Vin Wagner who is at the  office and JOSÉ Paige who is at the same  office at [facility] office.    Assessment/Plan     Impression:  Vin Wagner is a 24 y.o. gender-diverse who presents via in person for a follow up visit with CC of   \"  I went back down on the Lamictal 25 mg twice a day. \"    Patient consents to treatment.    Problem List Items Addressed This Visit             ICD-10-CM    Bipolar 1 disorder with moderate shweta (Multi) F31.12    Relevant Medications    lamoTRIgine (LaMICtal) 25 mg tablet    Other Relevant Orders    Follow Up In Psychiatry       Patient is reminded that if there is SI to call [phone] and get themselves to the closest ED for evaluation, otherwise contact me for other questions/concerns.    Patient presenting to outpatient treatment for a scheduled psych follow up visit.   HPI   Background:   Patient presenting to outpatient treatment for a scheduled psych follow up visit.  Patient is in the [facility] at the  campus for this visit.  Patient was seen a couple of weeks ago for management of Bipolar.   Last visit the dose of Lamictal was increased  to 50 mg BID.   Patient reports the medication has NOT helped with  bipolar management. Reports the dose increase made him too sleepy and he decided to stop taking a higher dose. Expalins that he went back to 25 mg of Lamictal BID.  Patient hopes to feel better.    Characteristics/Recent psychiatric symptoms (pertinent positives and negatives):    Denies depressive symptoms. Denies panic attacks. Reports the 50 mg BID has made him very tired, therefore, he went back to " Lamictal 25 mg BID. Reports high anxiety related to hosting his friend. Reports he would like a higher dose of Lamictal, but it is making him too sleepy.  Reports getting 6-8 hours of sleep at night. Appetite is normal.  Denies wishes for death or consideration for suicide.  CSSRS negative. Denies shweta of psychosis.   Patient does explain that current dose of Lamictal has been successful in the management of anxiety and depressive symptoms.     -SI/HI : denies        Psychiatric Review Of Systems:  Depressive Symptoms: negative  Manic Symptoms: negative    Anxiety Symptoms: General Anxiety Disorder (NED)NED Behaviors: increased anxiety related to helping out his friend  Psychotic Symptoms: negative      REVIEW OF SYSTEMS  Review of Systems   Constitutional: Negative.    Psychiatric/Behavioral: Negative.       All other ROS negative    Current Medications:    Current Outpatient Medications:     buPROPion XL (Wellbutrin XL) 150 mg 24 hr tablet, Take 1 tablet (150 mg) by mouth once daily in the morning. Do not crush, chew, or split., Disp: 90 tablet, Rfl: 1    lamoTRIgine (LaMICtal) 25 mg tablet, Take 1 tablet (25 mg) by mouth 2 times a day., Disp: 180 tablet, Rfl: 1    roflumilast (Zoryve) 0.3 % foam, Apply 1 Application topically once daily., Disp: 60 g, Rfl: 3     Medical History:  Past Medical History:   Diagnosis Date    Mental illness in member of household      Surgical History:  History reviewed. No pertinent surgical history.  Family History:  Family History   Problem Relation Name Age of Onset    Heart attack Mother      Bipolar disorder Mother      Psoriasis Mother      Colon cancer Maternal Grandfather      Hyperlipidemia Maternal Grandfather      Diabetes Maternal Grandfather      Stroke Maternal Grandfather      Hyperlipidemia Paternal Grandfather       Social History:  Social History     Socioeconomic History    Marital status: Unknown     Spouse name: Not on file    Number of children: Not on file     "Years of education: Not on file    Highest education level: Not on file   Occupational History    Not on file   Tobacco Use    Smoking status: Never    Smokeless tobacco: Never    Tobacco comments:     Subject to second-hand smoke as a child fairly often   Vaping Use    Vaping status: Never Used   Substance and Sexual Activity    Alcohol use: Never    Drug use: Never    Sexual activity: Yes     Partners: Male     Birth control/protection: Condom Male   Other Topics Concern    Not on file   Social History Narrative    Not on file     Social Drivers of Health     Financial Resource Strain: Not on file   Food Insecurity: Not on file   Transportation Needs: Not on file   Physical Activity: Not on file   Stress: Not on file   Social Connections: Not on file   Intimate Partner Violence: Not on file   Housing Stability: Not on file     Vitals:  Vitals:    03/04/25 1453   BP: 113/79   Pulse: 91   Temp: 36.7 °C (98 °F)     Allergies:  No Known Allergies    -PCP: Pierce Guevara MD    -TBI/head trauma/LOC/seizure hx: Denies    Objective   Appearance: Well groomed    Attitude: Calm, cooperative, and engaged in conversation.    Behavior: Appropriate eye contact.     Motor Activity: No psychomotor agitation or retardation. No abnormal movements, tremors or tics. No evidence of extrapyramidal symptoms or tardive dyskinesia.    Speech: Regular rate, rhythm, volume. Spontaneous, no pressured speech.    Mood:  \" Good mood today. \"    Affect: Full range, mood congruent.    Thought Process: Linear, logical, and goal-directed. No loose associations or gross thought disorganization.    Thought Content: Denied current suicidal ideation or thoughts of harm to self, denied homicidal ideation or thoughts of harm to others. No delusional thinking elicited. No perseverations or obsessions identified.     Perception: Did not endorse auditory or visual hallucinations, did not appear to be responding to hallucinatory stimuli.     Cognition: " Alert, oriented x3. Preserved attention span and concentration, recent and remote memory. Adequate fund of knowledge. No deficits in language.     Insight: Fair, in regards to understanding mental health condition    Judgement: Fair        Physical Exam      -Diagnoses and all orders for this visit:  Bipolar 1 disorder with moderate shweta (Multi)  -     Follow Up In Psychiatry         MEDICAL-DECISION MAKING    -Patient educated and verbalized understanding on benefits, risks, and side effects of Lamictal.     Psych med regimen as follows:  -INCREASE  Lamictal to 50 mg at night, 25 mg in the  mornings. ( For a toatl of 75 mg daily)  -CONTINUE Wellbutrin  mg daily  -CONTINUE Psychotherapy  -START exercising and eating a healthy diet  -Safety plan reviewed  -READ attached information about the prescribed new medication   -CALL OFFICE IN CASE OF SIDE EFFECT TO SCHEDULE A VISIT FOR ASSESSMENT -[phone]    SI/HI ASSESSMENT  -Patient denied current suicidal ideation or thoughts of harm to self, denied homicidal ideation or thoughts of harm to others. No delusional thinking elicited. No perseverations or obsessions identified.     PLAN  -Continue Medications as directed.  -Follow-up with this provider in 10 weeks.  -Risks/benefits/assessment of medication interventions discussed with pt; pt agreeable to plan. Will continue to monitor for symptoms mgmt and SEs and adjust plan as needed.  -MI to increase coping skills/behavior regulation.  -Safety plan reviewed.  -Call  Psychiatry at [phone] with issues.  -For [facility] residents, [employer] is a 24/7 hotline you can call for assistance at [phone]. Please call 911 or go to your closest Emergency Room if you feel worse. This includes thoughts of hurting yourself or anyone else, or having other troubles such as hearing voices, seeing visions, or having new and scary thoughts about the people around you.    OARRS:  No data recorded  I have  personally reviewed the OARRS report for Vin Wagner. I have considered the risks of abuse, dependence, addiction and diversion  Medication is felt to be clinically appropriate based on documented diagnosis.      Cande Wallace, APRN-CNP  Record Review: extensive  CALL OFFICE IN CASE OF SIDE EFFECT TO SCHEDULE A VISIT FOR ASSESSMENT -[phone]  Follow up:   May 20th at 4 pm in person  Time Spent:  Prep:   Direct time: 30 minutes  Documentation: 3 minutes  Total: 33 minutes

## 2025-05-20 ENCOUNTER — APPOINTMENT (OUTPATIENT)
Dept: BEHAVIORAL HEALTH | Facility: CLINIC | Age: 25
End: 2025-05-20
Payer: COMMERCIAL

## 2025-06-14 DIAGNOSIS — F31.12 BIPOLAR 1 DISORDER WITH MODERATE MANIA (MULTI): ICD-10-CM

## 2025-06-14 RX ORDER — BUPROPION HYDROCHLORIDE 150 MG/1
150 TABLET ORAL
Qty: 90 TABLET | Refills: 0 | Status: SHIPPED | OUTPATIENT
Start: 2025-06-14

## 2025-12-12 ENCOUNTER — APPOINTMENT (OUTPATIENT)
Dept: PRIMARY CARE | Facility: CLINIC | Age: 25
End: 2025-12-12
Payer: COMMERCIAL